# Patient Record
Sex: FEMALE | Race: AMERICAN INDIAN OR ALASKA NATIVE
[De-identification: names, ages, dates, MRNs, and addresses within clinical notes are randomized per-mention and may not be internally consistent; named-entity substitution may affect disease eponyms.]

---

## 2020-03-24 ENCOUNTER — HOSPITAL ENCOUNTER (EMERGENCY)
Dept: HOSPITAL 5 - ED | Age: 55
Discharge: HOME | End: 2020-03-24
Payer: COMMERCIAL

## 2020-03-24 VITALS — DIASTOLIC BLOOD PRESSURE: 98 MMHG | SYSTOLIC BLOOD PRESSURE: 142 MMHG

## 2020-03-24 DIAGNOSIS — I10: Primary | ICD-10-CM

## 2020-03-24 LAB
ALBUMIN SERPL-MCNC: 4.6 G/DL (ref 3.9–5)
ALT SERPL-CCNC: 15 UNITS/L (ref 7–56)
BILIRUB UR QL STRIP: (no result)
BLOOD UR QL VISUAL: (no result)
BUN SERPL-MCNC: 8 MG/DL (ref 7–17)
BUN/CREAT SERPL: 11 %
CALCIUM SERPL-MCNC: 10.2 MG/DL (ref 8.4–10.2)
HCT VFR BLD CALC: 47.6 % (ref 30.3–42.9)
HEMOLYSIS INDEX: 13
HGB BLD-MCNC: 16 GM/DL (ref 10.1–14.3)
MCHC RBC AUTO-ENTMCNC: 34 % (ref 30–34)
MCV RBC AUTO: 90 FL (ref 79–97)
MUCOUS THREADS #/AREA URNS HPF: (no result) /HPF
PH UR STRIP: 7 [PH] (ref 5–7)
PLATELET # BLD: 322 K/MM3 (ref 140–440)
PROT UR STRIP-MCNC: (no result) MG/DL
RBC # BLD AUTO: 5.27 M/MM3 (ref 3.65–5.03)
RBC #/AREA URNS HPF: 3 /HPF (ref 0–6)
UROBILINOGEN UR-MCNC: < 2 MG/DL (ref ?–2)
WBC #/AREA URNS HPF: 1 /HPF (ref 0–6)

## 2020-03-24 PROCEDURE — 93005 ELECTROCARDIOGRAM TRACING: CPT

## 2020-03-24 PROCEDURE — 36415 COLL VENOUS BLD VENIPUNCTURE: CPT

## 2020-03-24 PROCEDURE — 80053 COMPREHEN METABOLIC PANEL: CPT

## 2020-03-24 PROCEDURE — 85027 COMPLETE CBC AUTOMATED: CPT

## 2020-03-24 PROCEDURE — 99283 EMERGENCY DEPT VISIT LOW MDM: CPT

## 2020-03-24 PROCEDURE — 81001 URINALYSIS AUTO W/SCOPE: CPT

## 2020-03-24 PROCEDURE — 93010 ELECTROCARDIOGRAM REPORT: CPT

## 2020-03-24 NOTE — EMERGENCY DEPARTMENT REPORT
ED General Adult HPI





- General


Chief complaint: High BP


Stated complaint: BP HIGH


Time Seen by Provider: 03/24/20 17:04


Source: patient


Mode of arrival: Ambulatory


Limitations: No Limitations





- History of Present Illness


Initial comments: 


Ms. Rosa is a 55 y/o aaf with hx of htn , managed by pcp with Amlodipine 5 mg 

po daily pt presents today for elevated bp today. pt denies symptoms no 

headache, no dizziness, no light headedness, no n/v , no diaphoresis. no chest 

pain. bp in triage today 148/98. 





Onset/Timing: 3


-: days(s)


Radiation: non-radiation


Severity scale (0 -10): 0


Consistency: intermittent


Improves with: none


Worsens with: none


Associated Symptoms: denies other symptoms


Treatments Prior to Arrival: none





- Related Data


                                  Previous Rx's











 Medication  Instructions  Recorded  Last Taken  Type


 


Famotidine [Pepcid] 20 mg PO BID #20 tablet 04/24/14 Unknown Rx


 


Hyoscyamine Subl [Levsin Sl] 0.125 mg SL Q4HR PRN #14 tablet 04/24/14 Unknown Rx


 


Promethazine [Phenergan] 25 mg PO Q6H PRN #10 tablet 04/24/14 Unknown Rx


 


Amlodipine Besylate [Norvasc] 5 mg PO DAILY #30 tablet 03/19/20 Unknown Rx


 


hydroCHLOROthiazide [HCTZ] 12.5 mg PO QDAY #30 tablet 03/24/20 Unknown Rx











                                    Allergies











Allergy/AdvReac Type Severity Reaction Status Date / Time


 


No Known Allergies Allergy   Verified 04/24/14 04:32














ED Review of Systems


ROS: 


Stated complaint: BP HIGH


Other details as noted in HPI





Constitutional: denies: chills, fever


Eyes: denies: eye pain, eye discharge, vision change


ENT: denies: ear pain, throat pain


Respiratory: denies: cough, shortness of breath, wheezing


Cardiovascular: denies: chest pain, palpitations


Endocrine: no symptoms reported


Gastrointestinal: denies: abdominal pain, nausea, diarrhea


Genitourinary: denies: urgency, dysuria, discharge


Musculoskeletal: denies: back pain, joint swelling, arthralgia


Skin: as per HPI


Neurological: denies: headache, weakness, paresthesias


Psychiatric: denies: anxiety, depression


Hematological/Lymphatic: denies: easy bleeding, easy bruising





ED Past Medical Hx





- Past Medical History


Hx Hypertension: Yes





- Surgical History


Hx Appendectomy: Yes





- Social History


Smoking Status: Never Smoker


Substance Use Type: None





- Medications


Home Medications: 


                                Home Medications











 Medication  Instructions  Recorded  Confirmed  Last Taken  Type


 


Famotidine [Pepcid] 20 mg PO BID #20 tablet 04/24/14  Unknown Rx


 


Hyoscyamine Subl [Levsin Sl] 0.125 mg SL Q4HR PRN #14 tablet 04/24/14  Unknown 

Rx


 


Promethazine [Phenergan] 25 mg PO Q6H PRN #10 tablet 04/24/14  Unknown Rx


 


Amlodipine Besylate [Norvasc] 5 mg PO DAILY #30 tablet 03/19/20  Unknown Rx


 


hydroCHLOROthiazide [HCTZ] 12.5 mg PO QDAY #30 tablet 03/24/20  Unknown Rx














ED Physical Exam





- General


Limitations: No Limitations


General appearance: alert, in no apparent distress





- Head


Head exam: Present: atraumatic, normocephalic





- Eye


Eye exam: Present: normal appearance, PERRL, EOMI


Pupils: Present: normal accommodation





- ENT


ENT exam: Present: normal exam, normal orophraynx, mucous membranes moist





- Neck


Neck exam: Present: normal inspection, full ROM.  Absent: tenderness, 

lymphadenopathy, thyromegaly





- Respiratory


Respiratory exam: Present: normal lung sounds bilaterally.  Absent: respiratory 

distress, wheezes, stridor, chest wall tenderness





- Cardiovascular


Cardiovascular Exam: Present: regular rate, normal rhythm, normal heart sounds. 

 Absent: systolic murmur, diastolic murmur, rubs, gallop





- GI/Abdominal


GI/Abdominal exam: Present: soft, normal bowel sounds.  Absent: distended, 

tenderness, guarding, rebound, rigid, bruit, hernia





- Rectal


Rectal exam: Present: deferred





- Extremities Exam


Extremities exam: Present: normal inspection





- Back Exam


Back exam: Present: normal inspection, full ROM.  Absent: tenderness, CVA 

tenderness (R), CVA tenderness (L), rash noted





- Neurological Exam


Neurological exam: Present: alert, oriented X3, CN II-XII intact, normal gait, 

reflexes normal.  Absent: motor sensory deficit





- Psychiatric


Psychiatric exam: Present: normal affect, normal mood





- Skin


Skin exam: Present: warm, dry, intact, normal color.  Absent: rash





ED Course





                                   Vital Signs











  03/24/20





  16:47


 


Temperature 97.9 F


 


Pulse Rate 96 H


 


Respiratory 16





Rate 


 


Blood Pressure 142/98





[Left] 


 


O2 Sat by Pulse 100





Oximetry 














ED Medical Decision Making





- Lab Data


Result diagrams: 


                                 03/24/20 18:25





                                 03/24/20 18:25








Labs











  03/24/20 03/24/20 03/24/20





  17:47 18:25 18:25


 


WBC    10.1


 


RBC    5.27 H


 


Hgb    16.0 H


 


Hct    47.6 H


 


MCV    90


 


MCH    30


 


MCHC    34


 


RDW    13.4


 


Plt Count    322


 


Sodium   137 


 


Potassium   4.1 


 


Chloride   100.7 


 


Carbon Dioxide   23 


 


Anion Gap   17 


 


BUN   8 


 


Creatinine   0.7 


 


Estimated GFR   > 60 


 


BUN/Creatinine Ratio   11 


 


Glucose   127 H 


 


Calcium   10.2 


 


Total Bilirubin   0.40 


 


AST   15 


 


ALT   15 


 


Alkaline Phosphatase   76 


 


Total Protein   8.0 


 


Albumin   4.6 


 


Albumin/Globulin Ratio   1.4 


 


Urine Color  Yellow  


 


Urine Turbidity  Clear  


 


Urine pH  7.0  


 


Ur Specific Gravity  1.015  


 


Urine Protein  <15 mg/dl  


 


Urine Glucose (UA)  Neg  


 


Urine Ketones  80  


 


Urine Blood  Neg  


 


Urine Nitrite  Neg  


 


Urine Bilirubin  Neg  


 


Urine Urobilinogen  < 2.0  


 


Ur Leukocyte Esterase  Neg  


 


Urine WBC (Auto)  1.0  


 


Urine RBC (Auto)  3.0  


 


U Epithel Cells (Auto)  2.0  


 


Urine Mucus  Few  














- EKG Data


EKG shows normal: sinus rhythm, axis, intervals, QRS complexes, ST-T waves


Rate: normal





- EKG Data


When compared to previous EKG there are: previous EKG unavailable


Interpretation: normal EKG


EKG NSR no ST Elevated MI, 


03/24/20 19:40








- Medical Decision Making


This is asymptomatic hypertension.  Patient has primary doctor Dr. Kimbrough, She 

is currently taking amlodipine 5 mg p.o. daily, patient is maintaining BP log, 

will try hydrochlorothiazide 25 p.o. daily for next week to include log and she 

has follow-up with PCP then.  Physical exam is normal today EKG normal sinus 

rhythm no ST elevated MI interpreted by ED attending.  Kidney function is 

normal.  Patient will be DC'd home in stable condition at this time.  Patient 

verbalized agreement and understanding with discharge plan


Critical care attestation.: 


If time is entered above; I have spent that time in minutes in the direct care 

of this critically ill patient, excluding procedure time.








ED Disposition


Clinical Impression: 


HTN (hypertension)


Qualifiers:


 Hypertension type: essential hypertension Qualified Code(s): I10 - Essential 

(primary) hypertension





Disposition: DC-01 TO HOME OR SELFCARE


Is pt being admited?: No


Does the pt Need Aspirin: No


Condition: Stable


Instructions:  Hypertension (ED)


Prescriptions: 


hydroCHLOROthiazide [HCTZ] 12.5 mg PO QDAY #30 tablet


Referrals: 


YEVGENIY KIMBROUGH JR, MD [Primary Care Provider] - 3-5 Days


Forms:  Work/School Release Form(ED)


Time of Disposition: 19:46

## 2020-03-24 NOTE — EVENT NOTE
ED Screening Note


Date of service: 03/24/20


Time: 17:05


ED Screening Note: 








This initial assessment/diagnostic orders/clinical plan/treatment(s) is/are 

subject to change based on patients health status, clinical progression and re-

assessment by fellow clinical providers in the ED. Further treatment and workup 

at subsequent clinical providers discretion. Patient/guardian urged not to elope

from the ED as their condition may be serious if not clinically assessed and 

managed. 





Initial orders include: 


55yo Bf states that she has BP elevations recently that she has kept a log of 5 

days. She reports dizziness x 1 day.

## 2020-05-13 ENCOUNTER — HOSPITAL ENCOUNTER (EMERGENCY)
Dept: HOSPITAL 5 - ED | Age: 55
LOS: 1 days | Discharge: HOME | End: 2020-05-14
Payer: COMMERCIAL

## 2020-05-13 DIAGNOSIS — I10: ICD-10-CM

## 2020-05-13 DIAGNOSIS — F43.0: ICD-10-CM

## 2020-05-13 DIAGNOSIS — Z79.899: ICD-10-CM

## 2020-05-13 DIAGNOSIS — F41.1: Primary | ICD-10-CM

## 2020-05-13 DIAGNOSIS — Z90.49: ICD-10-CM

## 2020-05-13 LAB
BASOPHILS # (AUTO): 0 K/MM3 (ref 0–0.1)
BASOPHILS NFR BLD AUTO: 0.3 % (ref 0–1.8)
BUN SERPL-MCNC: 9 MG/DL (ref 7–17)
BUN/CREAT SERPL: 10 %
CALCIUM SERPL-MCNC: 10.3 MG/DL (ref 8.4–10.2)
EOSINOPHIL # BLD AUTO: 0.1 K/MM3 (ref 0–0.4)
EOSINOPHIL NFR BLD AUTO: 0.9 % (ref 0–4.3)
HCT VFR BLD CALC: 48.8 % (ref 30.3–42.9)
HEMOLYSIS INDEX: 10
HGB BLD-MCNC: 16.2 GM/DL (ref 10.1–14.3)
LYMPHOCYTES # BLD AUTO: 2.6 K/MM3 (ref 1.2–5.4)
LYMPHOCYTES NFR BLD AUTO: 25.6 % (ref 13.4–35)
MCHC RBC AUTO-ENTMCNC: 34 % (ref 30–34)
MCV RBC AUTO: 91 FL (ref 79–97)
MONOCYTES # (AUTO): 0.8 K/MM3 (ref 0–0.8)
MONOCYTES % (AUTO): 7.6 % (ref 0–7.3)
PLATELET # BLD: 293 K/MM3 (ref 140–440)
RBC # BLD AUTO: 5.36 M/MM3 (ref 3.65–5.03)

## 2020-05-13 PROCEDURE — 85025 COMPLETE CBC W/AUTO DIFF WBC: CPT

## 2020-05-13 PROCEDURE — 36415 COLL VENOUS BLD VENIPUNCTURE: CPT

## 2020-05-13 PROCEDURE — 80048 BASIC METABOLIC PNL TOTAL CA: CPT

## 2020-05-13 PROCEDURE — 84484 ASSAY OF TROPONIN QUANT: CPT

## 2020-05-13 PROCEDURE — 71046 X-RAY EXAM CHEST 2 VIEWS: CPT

## 2020-05-13 PROCEDURE — 93005 ELECTROCARDIOGRAM TRACING: CPT

## 2020-05-13 PROCEDURE — 81001 URINALYSIS AUTO W/SCOPE: CPT

## 2020-05-13 NOTE — XRAY REPORT
CHEST 2 VIEWS 



INDICATION / CLINICAL INFORMATION:

Chest Pain.



COMPARISON: 

Chest radiograph 4/24/2014



FINDINGS:



SUPPORT DEVICES: None.



HEART / MEDIASTINUM: No significant abnormality. 



LUNGS / PLEURA: No significant pulmonary or pleural abnormality. No pneumothorax. 



ADDITIONAL FINDINGS: No significant additional findings.



IMPRESSION:

No acute finding.



Signer Name: Nehemias Blair MD 

Signed: 5/13/2020 7:54 PM

Workstation Name: [id number]

## 2020-05-14 VITALS — SYSTOLIC BLOOD PRESSURE: 143 MMHG | DIASTOLIC BLOOD PRESSURE: 86 MMHG

## 2020-05-14 LAB
BILIRUB UR QL STRIP: (no result)
BLOOD UR QL VISUAL: (no result)
PH UR STRIP: 8 [PH] (ref 5–7)
PROT UR STRIP-MCNC: (no result) MG/DL
RBC #/AREA URNS HPF: 1 /HPF (ref 0–6)
UROBILINOGEN UR-MCNC: < 2 MG/DL (ref ?–2)
WBC #/AREA URNS HPF: 3 /HPF (ref 0–6)

## 2020-05-14 NOTE — EMERGENCY DEPARTMENT REPORT
ED General Adult HPI





- General


Chief complaint: High BP


Stated complaint: LEFT ARM TINGLING/HBP


Source: patient


Mode of arrival: Ambulatory


Limitations: No Limitations





- History of Present Illness


Initial comments: 





Patient is a 54-year-old -American female with a history of hypertension 

and who is noncompliant with her medication presents to the ED with persistently

elevated blood pressure with headache and bilateral upper extremity tingling 

sensation for the last 12 hours worse in the last 4 hours.  Patient states that 

the last time she took her blood pressure medication was about 3 weeks ago.  

Patient states that she is supposed to take amlodipine 5 mg daily but has not 

taken this medicine.  Patient states that when she started having the symptoms 

she took amlodipine 5 mg tablet prior to arrival in the ED.  Patient denies 

dizziness, syncope, chest pain, shortness of breath, fever, chills, cough, 

nausea, vomiting, diaphoresis, abdominal pain, sore throat, change in vision, 

palpitations, fall or traumatic injury or hemoptysis.


MD Complaint: Elevated BP; Anxious; shaking and tingling left upper arm


-: Sudden, hour(s) (4)


Location: chest


Radiation: non-radiation


Severity scale (0 -10): 4


Quality: aching, dull


Consistency: intermittent


Improves with: none


Worsens with: none


Associated Symptoms: denies other symptoms, headaches, loss of appetite, 

malaise.  denies: confusion, chest pain, cough, diaphoresis, fever/chills, 

nausea/vomiting, rash, seizure, shortness of breath, syncope, weakness


Treatments Prior to Arrival: none





- Related Data


                                  Previous Rx's











 Medication  Instructions  Recorded  Last Taken  Type


 


Famotidine [Pepcid] 20 mg PO BID #20 tablet 14 Unknown Rx


 


Hyoscyamine Subl [Levsin Sl] 0.125 mg SL Q4HR PRN #14 tablet 14 Unknown Rx


 


Promethazine [Phenergan] 25 mg PO Q6H PRN #10 tablet 14 Unknown Rx


 


hydroCHLOROthiazide [HCTZ] 12.5 mg PO QDAY #30 tablet 20 Unknown Rx


 


Amlodipine Besylate [Norvasc] 5 mg PO DAILY #30 tablet 20 Unknown Rx


 


hydrOXYzine PAMOATE [Vistaril] 25 mg PO Q6HR PRN #30 capsule 20 Unknown Rx











                                    Allergies











Allergy/AdvReac Type Severity Reaction Status Date / Time


 


No Known Allergies Allergy   Verified 14 04:32














ED Review of Systems


ROS: 


Stated complaint: LEFT ARM TINGLING/HBP


Other details as noted in HPI





Constitutional: denies: chills, fever


Eyes: denies: eye pain, eye discharge, vision change


ENT: denies: ear pain, throat pain


Respiratory: denies: cough, shortness of breath, SOB with exertion, SOB at rest,

 wheezing


Cardiovascular: denies: chest pain, palpitations


Endocrine: no symptoms reported


Gastrointestinal: denies: abdominal pain, nausea, vomiting, diarrhea


Genitourinary: denies: urgency, dysuria, discharge


Musculoskeletal: denies: back pain, joint swelling, arthralgia


Skin: denies: rash, lesions


Neurological: denies: headache, weakness, paresthesias


Psychiatric: anxiety.  denies: depression, auditory hallucinations, visual 

hallucinations, homicidal thoughts, suicidal thoughts


Hematological/Lymphatic: denies: easy bleeding, easy bruising





ED Past Medical Hx





- Past Medical History


Previous Medical History?: Yes


Hx Hypertension: Yes





- Surgical History


Past Surgical History?: Yes


Hx Appendectomy: Yes





- Social History


Smoking Status: Never Smoker


Substance Use Type: None





- Medications


Home Medications: 


                                Home Medications











 Medication  Instructions  Recorded  Confirmed  Last Taken  Type


 


Famotidine [Pepcid] 20 mg PO BID #20 tablet 14  Unknown Rx


 


Hyoscyamine Subl [Levsin Sl] 0.125 mg SL Q4HR PRN #14 tablet 14  Unknown 

Rx


 


Promethazine [Phenergan] 25 mg PO Q6H PRN #10 tablet 14  Unknown Rx


 


hydroCHLOROthiazide [HCTZ] 12.5 mg PO QDAY #30 tablet 20  Unknown Rx


 


Amlodipine Besylate [Norvasc] 5 mg PO DAILY #30 tablet 20  Unknown Rx


 


hydrOXYzine PAMOATE [Vistaril] 25 mg PO Q6HR PRN #30 capsule 20  Unknown 

Rx














ED Physical Exam





- General


Limitations: No Limitations


General appearance: alert, in no apparent distress





- Head


Head exam: Present: atraumatic, normocephalic, normal inspection





- Eye


Eye exam: Present: normal appearance, PERRL, EOMI


Pupils: Present: normal accommodation





- ENT


ENT exam: Present: normal exam, normal orophraynx, mucous membranes moist, TM's 

normal bilaterally, normal external ear exam





- Neck


Neck exam: Present: normal inspection, full ROM





- Respiratory


Respiratory exam: Present: normal lung sounds bilaterally.  Absent: respiratory 

distress, wheezes, chest wall tenderness





- Cardiovascular


Cardiovascular Exam: Present: regular rate, normal rhythm, normal heart sounds. 

 Absent: systolic murmur, diastolic murmur, rubs, gallop





- GI/Abdominal


GI/Abdominal exam: Present: soft, normal bowel sounds.  Absent: tenderness, 

guarding, hyperactive bowel sounds, hypoactive bowel sounds, organomegaly





- Extremities Exam


Extremities exam: Present: normal inspection, full ROM, normal capillary refill





- Back Exam


Back exam: Present: normal inspection, full ROM.  Absent: tenderness, muscle 

spasm, paraspinal tenderness, vertebral tenderness





- Neurological Exam


Neurological exam: Present: alert, oriented X3, CN II-XII intact, normal gait, 

reflexes normal





- Psychiatric


Psychiatric exam: Present: normal affect, normal mood, anxious.  Absent: 

homicidal ideation, suicidal ideation





- Skin


Skin exam: Present: warm, dry, intact, normal color.  Absent: rash





ED Course


                                   Vital Signs











  20





  19:01 23:47 02:20


 


Temperature 97.9 F  97.8 F


 


Pulse Rate 85 84 78


 


Respiratory 20  16





Rate   


 


Blood Pressure 178/109  


 


Blood Pressure  148/101 143/86





[Left]   


 


O2 Sat by Pulse 100  98





Oximetry   














ED Medical Decision Making





- Lab Data


Result diagrams: 


                                 20 19:49





                                 20 19:49





- EKG Data


EKG shows normal: sinus rhythm


Rate: normal





- EKG Data


Interpretation: normal EKG





20 02:33


The EKG shows normal sinus rhythm with a ventricular rate of 81 bpm and no ST or

 T wave abnormalities.





- Radiology Data


Radiology results: report reviewed, image reviewed





Findings





[facility] 


[address] [place] [address] 





XRay Report 


Signed 





 Patient: DEVIN RUBIO MR#: [id number] 


 [id number] 


 : 1965 Acct:[id number] 





 Age/Sex: 54 / F ADM Date: 20 





 Loc: ED 


 Attending Dr: 








 Ordering Physician: ISMAEL ROBBINS 


 Date of Service: 20 


 Procedure(s): XR chest routine 2V 


 Accession Number(s): [id number] 





 cc: ISMAEL ROBBINS 





 Fluoro Time In Minutes: 





 CHEST 2 VIEWS 





INDICATION / CLINICAL INFORMATION: 


Chest Pain. 





COMPARISON: 


Chest radiograph 2014 





FINDINGS: 





SUPPORT DEVICES: None. 





HEART / MEDIASTINUM: No significant abnormality. 





LUNGS / PLEURA: No significant pulmonary or pleural abnormality. No 

pneumothorax. 





ADDITIONAL FINDINGS: No significant additional findings. 





IMPRESSION: 


No acute finding. 





Signer Name: Nehemias Blair MD 


Signed: 2020 7:54 PM 


Workstation Name: [id number] 








 Transcribed By: NARINDER 


 Dictated By: Nehemias Blair MD 


 Electronically Authenticated By: Nehemias Blair MD 


 Signed Date/Time: 20 











 DD/DT: 20 


 TD/TT: 





- Medical Decision Making





This is a 54-year-old -American female with a history of hypertension and

 who is noncompliant with her medication presents to the ED with persistently 

elevated blood pressure with headache and bilateral upper extremity tingling 

sensation for the last 12 hours worse in the last 4 hours.  Patient states that 

the last time she took her blood pressure medication was about 3 weeks ago.  

Patient states that she is supposed to take amlodipine 5 mg daily but has not 

taken this medicine.  Patient states that when she started having the symptoms 

she took amlodipine 5 mg tablet prior to arrival in the ED. in the ED, patient 

is alert and oriented x3 and is not in distress but hypertensive in triage.  The

 EKG shows normal sinus rhythm with ventricular rate of 81 bpm.  Chest x-ray 

shows no acute cardiopulmonary abnormalities or pneumonitis.  All lab test 

results were reviewed and are all nonactionable including initial and final 

troponin levels.  Patient was treated for anxiety in the ED and also given pain 

medication.  On reevaluation, patient's headache resolved and hypertension also 

resolved with treatment of her anxiety.  Patient was discharged home on 

medications and advised to follow-up with her primary care physician in 5 to 7 

days for reevaluation or return to the ED immediately if symptoms get worse.





- Differential Diagnosis


Anxiety; Hypertension; Tension Headache; CAD; Pneumonia


Critical care attestation.: 


If time is entered above; I have spent that time in minutes in the direct care 

of this critically ill patient, excluding procedure time.








ED Disposition


Clinical Impression: 


 Anxiety as acute reaction to exceptional stress, Uncontrolled stage 2 

hypertension





Disposition: DC- TO HOME OR SELFCARE


Is pt being admited?: No


Does the pt Need Aspirin: No


Condition: Stable


Instructions:  Hypertension (ED), Generalized Anxiety Disorder (ED)


Additional Instructions: 


All lab test results including chest x-ray are all nonactionable with no acute 

abnormalities.  Therefore take your medication as advised, drink plenty of 

fluids and follow-up with your primary care physician in 3 to 5 days for 

reevaluation.  Return to the ED immediately if symptoms get worse.


Prescriptions: 


Amlodipine Besylate [Norvasc] 5 mg PO DAILY #30 tablet


hydrOXYzine PAMOATE [Vistaril] 25 mg PO Q6HR PRN #30 capsule


 PRN Reason: Anxiety


Referrals: 


MASTER AVALOS MD [Staff Physician] - 3-5 Days


Time of Disposition: 02:23


Print Language: ENGLISH

## 2020-12-31 ENCOUNTER — HOSPITAL ENCOUNTER (EMERGENCY)
Dept: HOSPITAL 5 - ED | Age: 55
Discharge: HOME | End: 2020-12-31
Payer: COMMERCIAL

## 2020-12-31 VITALS — SYSTOLIC BLOOD PRESSURE: 165 MMHG | DIASTOLIC BLOOD PRESSURE: 96 MMHG

## 2020-12-31 DIAGNOSIS — J01.90: Primary | ICD-10-CM

## 2020-12-31 DIAGNOSIS — Z79.899: ICD-10-CM

## 2020-12-31 DIAGNOSIS — I10: ICD-10-CM

## 2020-12-31 DIAGNOSIS — Z90.49: ICD-10-CM

## 2020-12-31 PROCEDURE — 71046 X-RAY EXAM CHEST 2 VIEWS: CPT

## 2020-12-31 NOTE — XRAY REPORT
CHEST 2 VIEWS 



INDICATION / CLINICAL INFORMATION: RECENT COVID POS; SOB.



COMPARISON: 5/13/2020



FINDINGS:



SUPPORT DEVICES: None.

HEART / MEDIASTINUM: No significant abnormality. 

LUNGS / PLEURA: No significant pulmonary or pleural abnormality. No pneumothorax. 



ADDITIONAL FINDINGS: No significant additional findings.



IMPRESSION:

1. No acute findings. No significant interval change since prior exam.



Signer Name: Que Urena MD 

Signed: 12/31/2020 1:50 PM

Workstation Name: [id number]

## 2020-12-31 NOTE — EMERGENCY DEPARTMENT REPORT
Minor Respiratory





- HPI


Chief Complaint: Headache


Stated Complaint: HEADACHE/BACK PAIN


Time Seen by Provider: 12/31/20 13:04


Severity: mild


Minor Respiratory: Yes Rhinorrhea, Yes Sore Throat, Yes Able to Tolerate Fluids,

No Ear Pain, No Cough, No Sick Contacts, No Hemoptysis, No Chest Pain, No 

Shortness of Breath, No Fever


Other History: Patient is a 55-year-old female that comes to the ER today 

complaining of sinus pain and pressure, sore throat, and a mild cough.  She had 

a positive Covid test 14 days ago.  She had a negative Covid test this week.  

She denies any shortness of breath chest pain fever chills.





ED Review of Systems


ROS: 


Stated complaint: HEADACHE/BACK PAIN


Other details as noted in HPI





Comment: All other systems reviewed and negative





ED Past Medical Hx





- Past Medical History


Hx Hypertension: Yes





- Surgical History


Hx Appendectomy: Yes





- Family History


Family history: no significant





- Social History


Smoking Status: Never Smoker


Substance Use Type: None





- Medications


Home Medications: 


                                Home Medications











 Medication  Instructions  Recorded  Confirmed  Last Taken  Type


 


Famotidine [Pepcid] 20 mg PO BID #20 tablet 04/24/14  Unknown Rx


 


Hyoscyamine Subl [Levsin Sl] 0.125 mg SL Q4HR PRN #14 tablet 04/24/14  Unknown 

Rx


 


Promethazine [Phenergan] 25 mg PO Q6H PRN #10 tablet 04/24/14  Unknown Rx


 


hydroCHLOROthiazide [HCTZ] 12.5 mg PO QDAY #30 tablet 03/24/20  Unknown Rx


 


Amlodipine Besylate [Norvasc] 5 mg PO DAILY #30 tablet 05/14/20  Unknown Rx


 


hydrOXYzine PAMOATE [Vistaril] 25 mg PO Q6HR PRN #30 capsule 05/14/20  Unknown 

Rx


 


Azithromycin [Zithromax Z-GIANFRANCO] 250 mg PO DAILY #6 tablet 12/31/20  Unknown Rx


 


Fluticasone [Flonase] 1 spray NS QDAY #1 bottle 12/31/20  Unknown Rx


 


predniSONE [Deltasone] 20 mg PO DAILY #5 tablet 12/31/20  Unknown Rx














Minor Respiratory Exam





- Exam


General: 


Vital signs noted. No distress. Alert and acting appropriately.





HEENT: Yes Moist Mucous Membranes, No Pharyngeal Erythema, No Pharyngeal 

Exudates, No Rhinorrhea, No Conjuctival Injection, No Frontal Tenderness, No 

Maxillary Tenderness


Ear: Neither TM Bulge, Neither TM Erythema, Neither EAC Pain, Neither EAC 

Discharge


Neck: Yes Supple, No Adenopathy


Lungs: Yes Good Air Exchange, No Wheezes, No Ronchi, No Stridor, No Cough, No 

Labored Respirations, No Retractions, No Use of Accessory Muscles, No Other 

Abnormal Lung Sounds


Heart: Yes Regular, No Murmur


Abdomen: Yes Normal Bowel Sounds, No Tenderness, No Peritoneal Signs


Skin: No Rash, No Edema


Neurologic: 


Alert and oriented, no deficits.








Musculoskeletal: 


Unremarkable.











ED Course


                                   Vital Signs











  12/31/20





  13:03


 


Temperature 97.9 F


 


Pulse Rate 90


 


Respiratory 16





Rate 


 


Blood Pressure 165/96


 


O2 Sat by Pulse 96





Oximetry 














ED Medical Decision Making





- Radiology Data


Radiology results: report reviewed, image reviewed





No acute process





- Medical Decision Making





                                   Vital Signs











  12/31/20





  13:03


 


Temperature 97.9 F


 


Pulse Rate 90


 


Respiratory 16





Rate 


 


Blood Pressure 165/96


 


O2 Sat by Pulse 96





Oximetry 








X-ray noted to be without pneumonia or consolidation.





Patient being treated for sinusitis.





Patient being discharged home with Rx, follow-up, diet activity and work 

instructions.  She verbalizes understanding.





On discharge patient is in no acute distress.  Ambulatory without shortness of 

breath and with normal vital signs.  Patient is tearful and relates that she 

does not have Covid pneumonia.





- Differential Diagnosis


Rule out pneumonia


Critical care attestation.: 


If time is entered above; I have spent that time in minutes in the direct care 

of this critically ill patient, excluding procedure time.








ED Disposition


Clinical Impression: 


 URTI (acute upper respiratory infection), Sinusitis





Disposition: DC-01 TO HOME OR SELFCARE


Is pt being admited?: No


Does the pt Need Aspirin: No


Condition: Stable


Instructions:  Viral Respiratory Infection, Easy-To-Read


Additional Instructions: 


MEDS AS ORDERED





Stay well-hydrated





Motrin or Tylenol for pain





Over-the-counter cough suppressant for any coughing





Medications as ordered





Follow-up with primary care on Monday for recheck


Referral below





Prescriptions: 


predniSONE [Deltasone] 20 mg PO DAILY #5 tablet


Fluticasone [Flonase] 1 spray NS QDAY #1 bottle


Azithromycin [Zithromax Z-GIANFRANCO] 250 mg PO DAILY #6 tablet


Referrals: 


MASTER AVALOS MD [Staff Physician] - 3-5 Days


Time of Disposition: 14:15

## 2020-12-31 NOTE — EVENT NOTE
ED Screening Note


ED Screening Note: 





POS COVID 14 DAYS AGO


HAD NEG THIS WEEK





CO HEADACHE





NO COUGH


NO SOB


NO PLEURITIC PAIN 


NO CP





DID NOT SEE PCP 





RX 


NONE





HAS NEVER SEEN MD





This initial assessment/diagnostic orders/clinical plan/treatment(s) is/are 

subject to change based on patients health status, clinical progression and 

re-assessment by fellow clinical providers in the ED. Further treatment and 

workup at subsequent clinical providers discretion. Patient/guardian urged not 

to elope from the ED as their condition may be serious if not clinically 

assessed and managed. 





Initial orders include: 


XR RO PNA


V 


SINUSITIS

## 2021-10-31 ENCOUNTER — HOSPITAL ENCOUNTER (EMERGENCY)
Dept: HOSPITAL 5 - ED | Age: 56
Discharge: HOME | End: 2021-10-31
Payer: COMMERCIAL

## 2021-10-31 VITALS — SYSTOLIC BLOOD PRESSURE: 161 MMHG | DIASTOLIC BLOOD PRESSURE: 90 MMHG

## 2021-10-31 DIAGNOSIS — M62.838: Primary | ICD-10-CM

## 2021-10-31 DIAGNOSIS — M25.59: ICD-10-CM

## 2021-10-31 DIAGNOSIS — Z79.899: ICD-10-CM

## 2021-10-31 DIAGNOSIS — Z90.49: ICD-10-CM

## 2021-10-31 DIAGNOSIS — I10: ICD-10-CM

## 2021-10-31 LAB
BASOPHILS # (AUTO): 0 K/MM3 (ref 0–0.1)
BASOPHILS NFR BLD AUTO: 0.2 % (ref 0–1.8)
BUN SERPL-MCNC: 15 MG/DL (ref 7–17)
BUN/CREAT SERPL: 17 %
CALCIUM SERPL-MCNC: 10.1 MG/DL (ref 8.4–10.2)
EOSINOPHIL # BLD AUTO: 0 K/MM3 (ref 0–0.4)
EOSINOPHIL NFR BLD AUTO: 0.3 % (ref 0–4.3)
HCT VFR BLD CALC: 45.9 % (ref 30.3–42.9)
HEMOLYSIS INDEX: 8
HGB BLD-MCNC: 15.2 GM/DL (ref 10.1–14.3)
LYMPHOCYTES # BLD AUTO: 1.9 K/MM3 (ref 1.2–5.4)
LYMPHOCYTES NFR BLD AUTO: 18 % (ref 13.4–35)
MCHC RBC AUTO-ENTMCNC: 33 % (ref 30–34)
MCV RBC AUTO: 93 FL (ref 79–97)
MONOCYTES # (AUTO): 0.9 K/MM3 (ref 0–0.8)
MONOCYTES % (AUTO): 8.4 % (ref 0–7.3)
PLATELET # BLD: 299 K/MM3 (ref 140–440)
RBC # BLD AUTO: 4.95 M/MM3 (ref 3.65–5.03)

## 2021-10-31 PROCEDURE — 80048 BASIC METABOLIC PNL TOTAL CA: CPT

## 2021-10-31 PROCEDURE — 99283 EMERGENCY DEPT VISIT LOW MDM: CPT

## 2021-10-31 PROCEDURE — 36415 COLL VENOUS BLD VENIPUNCTURE: CPT

## 2021-10-31 PROCEDURE — 83735 ASSAY OF MAGNESIUM: CPT

## 2021-10-31 PROCEDURE — 85025 COMPLETE CBC W/AUTO DIFF WBC: CPT

## 2021-10-31 NOTE — EMERGENCY DEPARTMENT REPORT
ED General Adult HPI





- General


Chief complaint: Medical Clearance


Stated complaint: SWELLING/BODY


Time Seen by Provider: 10/31/21 14:44


Source: patient


Mode of arrival: Ambulatory


Limitations: No Limitations





- History of Present Illness


Initial comments: 





56-year-old -American female patient presents with complaints of diffuse 

body aches, joint pains, and muscle spasms recurrently for the past 9 months.  

She denies any swelling or redness or fever/chills/sweats in her joints.  No 

history of cancer.  Past medical history includes hypertension.  Patient states 

she was seen by her primary care doctor a week ago for the symptoms and was 

prescribed a Medrol Dosepak.  Patient states the Medrol helps some, however she 

continues to experience random bouts of pain all over and muscle spasms in her 

legs bilaterally.  Patient also reports that she had blood work done by her 

primary care about 1 to 2 months ago and all blood work was reviewed at her 

visit last week and was found to be normal.  Chest pain or shortness of breath, 

swelling of the legs, history of DVT/PE, cough, hemoptysis, or recent long 

travel.  No hormone use per patient.





- Related Data


                                  Previous Rx's











 Medication  Instructions  Recorded  Last Taken  Type


 


Famotidine [Pepcid] 20 mg PO BID #20 tablet 04/24/14 Unknown Rx


 


Hyoscyamine Subl [Levsin Sl] 0.125 mg SL Q4HR PRN #14 tablet 04/24/14 Unknown Rx


 


Promethazine [Phenergan] 25 mg PO Q6H PRN #10 tablet 04/24/14 Unknown Rx


 


hydroCHLOROthiazide [HCTZ] 12.5 mg PO QDAY #30 tablet 03/24/20 Unknown Rx


 


Amlodipine Besylate [Norvasc] 5 mg PO DAILY #30 tablet 05/14/20 Unknown Rx


 


hydrOXYzine PAMOATE [Vistaril] 25 mg PO Q6HR PRN #30 capsule 05/14/20 Unknown Rx


 


Azithromycin [Zithromax Z-GIANFRANCO] 250 mg PO DAILY #6 tablet 12/31/20 Unknown Rx


 


Fluticasone [Flonase] 1 spray NS QDAY #1 bottle 12/31/20 Unknown Rx


 


predniSONE [Deltasone] 20 mg PO DAILY #5 tablet 12/31/20 Unknown Rx


 


Meloxicam [Mobic] 15 mg PO QDAY PRN #30 tablet 10/31/21 Unknown Rx


 


methocarbamoL [Methocarbamol] 750 mg PO TID PRN #30 tablet 10/31/21 Unknown Rx











                                    Allergies











Allergy/AdvReac Type Severity Reaction Status Date / Time


 


No Known Allergies Allergy   Verified 10/31/21 14:01














ED Review of Systems


ROS: 


Stated complaint: SWELLING/BODY


Other details as noted in HPI





Constitutional: denies: chills, diaphoresis, fever, malaise


Respiratory: denies: cough, shortness of breath


Cardiovascular: denies: chest pain


Musculoskeletal: arthralgia.  denies: joint swelling


Skin: denies: rash, lesions


Neurological: denies: numbness, paresthesias





ED Past Medical Hx





- Past Medical History


Hx Hypertension: Yes





- Surgical History


Hx Appendectomy: Yes


Additional Surgical History: TUBES TIED/ BREAST





- Social History


Smoking Status: Never Smoker


Substance Use Type: None





- Medications


Home Medications: 


                                Home Medications











 Medication  Instructions  Recorded  Confirmed  Last Taken  Type


 


Famotidine [Pepcid] 20 mg PO BID #20 tablet 04/24/14  Unknown Rx


 


Hyoscyamine Subl [Levsin Sl] 0.125 mg SL Q4HR PRN #14 tablet 04/24/14  Unknown 

Rx


 


Promethazine [Phenergan] 25 mg PO Q6H PRN #10 tablet 04/24/14  Unknown Rx


 


hydroCHLOROthiazide [HCTZ] 12.5 mg PO QDAY #30 tablet 03/24/20  Unknown Rx


 


Amlodipine Besylate [Norvasc] 5 mg PO DAILY #30 tablet 05/14/20  Unknown Rx


 


hydrOXYzine PAMOATE [Vistaril] 25 mg PO Q6HR PRN #30 capsule 05/14/20  Unknown 

Rx


 


Azithromycin [Zithromax Z-GIANFRANCO] 250 mg PO DAILY #6 tablet 12/31/20  Unknown Rx


 


Fluticasone [Flonase] 1 spray NS QDAY #1 bottle 12/31/20  Unknown Rx


 


predniSONE [Deltasone] 20 mg PO DAILY #5 tablet 12/31/20  Unknown Rx


 


Meloxicam [Mobic] 15 mg PO QDAY PRN #30 tablet 10/31/21  Unknown Rx


 


methocarbamoL [Methocarbamol] 750 mg PO TID PRN #30 tablet 10/31/21  Unknown Rx














ED Physical Exam





- General


Limitations: No Limitations


General appearance: alert, in no apparent distress





- Head


Head exam: Present: atraumatic, normocephalic





- Eye


Eye exam: Present: normal appearance.  Absent: scleral icterus





- Respiratory


Respiratory exam: Present: normal lung sounds bilaterally.  Absent: respiratory 

distress





- Cardiovascular


Cardiovascular Exam: Present: regular rate, normal rhythm





- Extremities Exam


Extremities exam: Present: normal inspection, full ROM.  Absent: tenderness, 

pedal edema, joint swelling, calf tenderness





- Neurological Exam


Neurological exam: Present: alert, oriented X3, normal gait





- Psychiatric


Psychiatric exam: Present: normal affect, normal mood





- Skin


Skin exam: Present: warm, dry, intact, normal color.  Absent: rash





ED Course


                                   Vital Signs











  10/31/21





  14:03


 


Temperature 98.0 F


 


Pulse Rate 59 L


 


Respiratory 18





Rate 


 


Blood Pressure 161/90


 


O2 Sat by Pulse 100





Oximetry 














ED Medical Decision Making





- Lab Data


Result diagrams: 


                                 10/31/21 15:30





                                 10/31/21 15:30





- Medical Decision Making








56-year-old -American female patient presents with complaints of diffuse 

body aches, joint pains, and muscle spasms recurrently for the past 9 months.  

She denies any swelling or redness or fever/chills/sweats in her joints.  No hi

story of cancer.  Past medical history includes hypertension.  Patient states 

she was seen by her primary care doctor a week ago for the symptoms and was 

prescribed a Medrol Dosepak.  Patient states the Medrol helps some, however she 

continues to experience random bouts of pain all over and muscle spasms in her 

legs bilaterally.  Patient also reports that she had blood work done by her 

primary care about 1 to 2 months ago and all blood work was reviewed at her 

visit last week and was found to be normal.  Chest pain or shortness of breath, 

swelling of the legs, history of DVT/PE, cough, hemoptysis, or recent long 

travel.  No hormone use per patient.








No electrolyte abnormalities noted as possible cause of muscle spasms.  Given 

chronicity of symptoms, recommend follow-up with PCP for further evaluation.  

Patient to discontinue ibuprofen and start meloxicam and use Robaxin as needed 

for muscle spasms.  Also discussed importance of hydration.  Patient informed of

 signs and symptoms that should prompt immediate return to the ED, she 

verbalizes understanding.  She is otherwise well-appearing and stable for 

discharge home


Critical care attestation.: 


If time is entered above; I have spent that time in minutes in the direct care 

of this critically ill patient, excluding procedure time.








ED Disposition


Clinical Impression: 


 Joint pain, Muscle spasm





Disposition: 01 HOME / SELF CARE / HOMELESS


Is pt being admited?: No


Condition: Stable


Instructions:  Muscle Cramps and Spasms, Easy-to-Read, Joint Pain


Prescriptions: 


methocarbamoL [Methocarbamol] 750 mg PO TID PRN #30 tablet


 PRN Reason: muscles spasms


Meloxicam [Mobic] 15 mg PO QDAY PRN #30 tablet


 PRN Reason: pain


Referrals: 


PRIMARY CARE,MD [Primary Care Provider] - 3-5 Days


Time of Disposition: 17:05